# Patient Record
Sex: MALE | URBAN - METROPOLITAN AREA
[De-identification: names, ages, dates, MRNs, and addresses within clinical notes are randomized per-mention and may not be internally consistent; named-entity substitution may affect disease eponyms.]

---

## 2024-07-02 ENCOUNTER — APPOINTMENT (RX ONLY)
Dept: URBAN - METROPOLITAN AREA CLINIC 47 | Facility: CLINIC | Age: 39
Setting detail: DERMATOLOGY
End: 2024-07-02

## 2024-07-02 DIAGNOSIS — B07.0 PLANTAR WART: ICD-10-CM

## 2024-07-02 DIAGNOSIS — Z71.89 OTHER SPECIFIED COUNSELING: ICD-10-CM

## 2024-07-02 PROCEDURE — ? COUNSELING

## 2024-07-02 PROCEDURE — 99202 OFFICE O/P NEW SF 15 MIN: CPT | Mod: 25

## 2024-07-02 PROCEDURE — ? BENIGN DESTRUCTION

## 2024-07-02 PROCEDURE — ? SUNSCREEN RECOMMENDATIONS

## 2024-07-02 PROCEDURE — 17110 DESTRUCTION B9 LES UP TO 14: CPT

## 2024-07-02 ASSESSMENT — LOCATION ZONE DERM: LOCATION ZONE: FEET

## 2024-07-02 ASSESSMENT — LOCATION DETAILED DESCRIPTION DERM: LOCATION DETAILED: RIGHT MEDIAL PLANTAR MIDFOOT

## 2024-07-02 ASSESSMENT — LOCATION SIMPLE DESCRIPTION DERM: LOCATION SIMPLE: RIGHT PLANTAR SURFACE

## 2024-07-02 NOTE — PROCEDURE: BENIGN DESTRUCTION
Consent: The patient's consent was obtained including but not limited to risks of crusting, scabbing, blistering, scarring, darker or lighter pigmentary change, recurrence, incomplete removal and infection.
Add 52 Modifier (Optional): no
Anesthesia Volume In Cc: 0.1
Detail Level: Detailed
Treatment Number (Will Not Render If 0): 1
Post-Care Instructions: I reviewed with the patient in detail post-care instructions. Patient is to wear sunprotection, and avoid picking at any of the treated lesions. Pt may apply Vaseline to crusted or scabbing areas.
Medical Necessity Information: It is in your best interest to select a reason for this procedure from the list below. All of these items fulfill various CMS LCD requirements except the new and changing color options.

## 2024-10-08 ENCOUNTER — APPOINTMENT (RX ONLY)
Dept: URBAN - METROPOLITAN AREA CLINIC 47 | Facility: CLINIC | Age: 39
Setting detail: DERMATOLOGY
End: 2024-10-08

## 2024-10-08 DIAGNOSIS — L82.0 INFLAMED SEBORRHEIC KERATOSIS: ICD-10-CM

## 2024-10-08 DIAGNOSIS — D22 MELANOCYTIC NEVI: ICD-10-CM

## 2024-10-08 DIAGNOSIS — L81.4 OTHER MELANIN HYPERPIGMENTATION: ICD-10-CM

## 2024-10-08 DIAGNOSIS — B07.8 OTHER VIRAL WARTS: ICD-10-CM

## 2024-10-08 DIAGNOSIS — L82.1 OTHER SEBORRHEIC KERATOSIS: ICD-10-CM

## 2024-10-08 PROBLEM — D48.5 NEOPLASM OF UNCERTAIN BEHAVIOR OF SKIN: Status: ACTIVE | Noted: 2024-10-08

## 2024-10-08 PROBLEM — D22.9 MELANOCYTIC NEVI, UNSPECIFIED: Status: ACTIVE | Noted: 2024-10-08

## 2024-10-08 PROCEDURE — 11302 SHAVE SKIN LESION 1.1-2.0 CM: CPT

## 2024-10-08 PROCEDURE — 99213 OFFICE O/P EST LOW 20 MIN: CPT | Mod: 25

## 2024-10-08 PROCEDURE — ? SHAVE REMOVAL

## 2024-10-08 PROCEDURE — 17110 DESTRUCTION B9 LES UP TO 14: CPT | Mod: 59

## 2024-10-08 PROCEDURE — ? LIQUID NITROGEN

## 2024-10-08 PROCEDURE — ? SUNSCREEN TREATMENT REGIMEN

## 2024-10-08 PROCEDURE — ? SUNSCREEN RECOMMENDATIONS

## 2024-10-08 PROCEDURE — ? COUNSELING

## 2024-10-08 ASSESSMENT — LOCATION ZONE DERM
LOCATION ZONE: FACE
LOCATION ZONE: FEET
LOCATION ZONE: TRUNK

## 2024-10-08 ASSESSMENT — LOCATION SIMPLE DESCRIPTION DERM
LOCATION SIMPLE: RIGHT CHEEK
LOCATION SIMPLE: CHEST
LOCATION SIMPLE: LEFT FOOT

## 2024-10-08 ASSESSMENT — LOCATION DETAILED DESCRIPTION DERM
LOCATION DETAILED: RIGHT CENTRAL MALAR CHEEK
LOCATION DETAILED: RIGHT MEDIAL INFERIOR CHEST
LOCATION DETAILED: LEFT DORSAL FOOT

## 2024-10-08 NOTE — PROCEDURE: SHAVE REMOVAL
Medical Necessity Information: It is in your best interest to select a reason for this procedure from the list below. All of these items fulfill various CMS LCD requirements except the new and changing color options.
Medical Necessity Clause: This procedure was medically necessary because the lesion that was treated was:
Lab: [id number]
Lab Facility: [id number]
Body Location Override (Optional - Billing Will Still Be Based On Selected Body Map Location If Applicable): left lateral chest wall
Detail Level: Detailed
Was A Bandage Applied: Yes
Size Of Lesion In Cm (Required): 2
X Size Of Lesion In Cm (Optional): 0
Depth Of Shave: dermis
Biopsy Method: Dermablade
Anesthesia Type: 1% lidocaine with epinephrine
Hemostasis: Electrocautery
Wound Care: Bacitracin
Path Notes (To The Dermatopathologist): Size: 2.0\\nR/O: SK vs DN
Render Path Notes In Note?: No
Consent was obtained from the patient. The risks and benefits to therapy were discussed in detail. Specifically, the risks of infection, scarring, bleeding, prolonged wound healing, incomplete removal, allergy to anesthesia, nerve injury and recurrence were addressed. Prior to the procedure, the treatment site was clearly identified and confirmed by the patient. All components of Universal Protocol/PAUSE Rule completed.
Post-Care Instructions: I reviewed with the patient in detail post-care instructions. Patient is to keep the biopsy site dry overnight, and then apply bacitracin twice daily until healed. Patient may apply hydrogen peroxide soaks to remove any crusting.
Notification Instructions: Patient will be notified of pathology results. However, patient instructed to call the office if not contacted within 2 weeks.
Billing Type: Third-Party Bill

## 2024-10-08 NOTE — HPI: SKIN LESION
Has Your Skin Lesion Been Treated?: not been treated
Is This A New Presentation, Or A Follow-Up?: Skin Lesion
Acquired ptosis of eyelid  2014  Acute carpal tunnel syndrome  with surgery  Complete tear of rotator cuff, right  1998  H/O cardiac catheterization  2005 & 2007  H/O laminectomy  3/4 lumbar    2002  H/O total hysterectomy  1971  Rectal adenoma  s/p excision 2005  S/P cataract surgery, left  June 2010  S/P cataract surgery, right  May 2010  S/P cholecystectomy  1991  S/P IVC filter  placed 1/9  S/P knee replacement, right  2007

## 2024-10-08 NOTE — PROCEDURE: LIQUID NITROGEN
Medical Necessity Clause: This procedure was medically necessary because the lesions that were treated were:
Detail Level: Detailed
Include Z78.9 (Other Specified Conditions Influencing Health Status) As An Associated Diagnosis?: No
Post-Care Instructions: I reviewed with the patient in detail post-care instructions. Patient is to wear sunprotection, and avoid picking at any of the treated lesions. Pt may apply Vaseline to crusted or scabbing areas.
Medical Necessity Information: It is in your best interest to select a reason for this procedure from the list below. All of these items fulfill various CMS LCD requirements except the new and changing color options.
Consent: The patient's consent was obtained including but not limited to risks of crusting, scabbing, blistering, scarring, darker or lighter pigmentary change, recurrence, incomplete removal and infection.
Spray Paint Text: The liquid nitrogen was applied to the skin utilizing a spray paint frosting technique.
Number Of Freeze-Thaw Cycles: 2 freeze-thaw cycles
Show Topical Anesthesia Variable?: Yes